# Patient Record
Sex: FEMALE | Race: WHITE | URBAN - METROPOLITAN AREA
[De-identification: names, ages, dates, MRNs, and addresses within clinical notes are randomized per-mention and may not be internally consistent; named-entity substitution may affect disease eponyms.]

---

## 2020-12-15 ENCOUNTER — OFFICE VISIT (OUTPATIENT)
Dept: URBAN - METROPOLITAN AREA CLINIC 27 | Facility: CLINIC | Age: 40
End: 2020-12-15
Payer: COMMERCIAL

## 2020-12-15 DIAGNOSIS — H25.13 AGE-RELATED NUCLEAR CATARACT, BILATERAL: ICD-10-CM

## 2020-12-15 DIAGNOSIS — E11.3391 TYPE 2 DIAB WITH MOD NONP RTNOP WITHOUT MACULAR EDEMA, R EYE: ICD-10-CM

## 2020-12-15 PROCEDURE — 92014 COMPRE OPH EXAM EST PT 1/>: CPT | Performed by: OPHTHALMOLOGY

## 2020-12-15 PROCEDURE — 92134 CPTRZ OPH DX IMG PST SGM RTA: CPT | Performed by: OPHTHALMOLOGY

## 2020-12-15 ASSESSMENT — INTRAOCULAR PRESSURE
OS: 15
OD: 17

## 2020-12-15 NOTE — IMPRESSION/PLAN
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 s/p Avastin, last 5/21/14 Plan: Exam and OCT reveal stable, mild juxtafoveal edema OS. The diagnosis, natural history, and prognosis of CSDME were discussed in detail with the patient. The risks, benefits, and alternatives of  intraocular and periocular steroids, Avastin, Lucentis, and observation were discussed. Given the overall stability, pt elects to observe today. We will continue to monitor her retinal status annually. Last A1C- 7.3 (10/30/2020) RTC 12 months OCT OU, re-eval

## 2020-12-15 NOTE — IMPRESSION/PLAN
Impression: Age-related nuclear cataract, bilateral: H25.13. OU.  Plan: Observe until bothersome for patient

## 2020-12-15 NOTE — IMPRESSION/PLAN
Impression: Type 2 diab with mod nonp rtnop without macular edema, r eye: W99.0908. OD. Plan: Stable. The diagnosis, natural history, and prognosis of NPDR were discussed. The importance of tight blood sugar, blood pressure, and cholesterol control and their relationship to progression of NPDR were thoroughly reviewed with the patient.

## 2021-12-14 ENCOUNTER — OFFICE VISIT (OUTPATIENT)
Dept: URBAN - METROPOLITAN AREA CLINIC 27 | Facility: CLINIC | Age: 41
End: 2021-12-14
Payer: COMMERCIAL

## 2021-12-14 DIAGNOSIS — E11.3312 TYPE 2 DIABETES MELLITUS WITH MODERATE NONPROLIFERATIVE DIABETIC RETINOPATHY WITH MACULAR EDEMA, LEFT EYE: Primary | ICD-10-CM

## 2021-12-14 PROCEDURE — 92134 CPTRZ OPH DX IMG PST SGM RTA: CPT | Performed by: OPHTHALMOLOGY

## 2021-12-14 PROCEDURE — 92014 COMPRE OPH EXAM EST PT 1/>: CPT | Performed by: OPHTHALMOLOGY

## 2021-12-14 ASSESSMENT — INTRAOCULAR PRESSURE
OS: 16
OD: 18

## 2021-12-14 NOTE — IMPRESSION/PLAN
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 s/p Avastin, last 5/21/14 Plan: Exam and OCT reveal resolved edema OS. Overall doing very well. The diagnosis, natural history, and prognosis of CSDME were discussed in detail with the patient. The risks, benefits, and alternatives of  intraocular and periocular steroids, Avastin, Lucentis, and observation were discussed. Given the overall stability, pt elects to continue observation. We will continue to monitor her retinal status annually. Last A1C- 9.2 (12/21) RTC 12 months OCT OU, re-eval

## 2021-12-14 NOTE — IMPRESSION/PLAN
Impression: Type 2 diab with mod nonp rtnop without macular edema, r eye: Q19.0403. OD. Plan: Stable. The diagnosis, natural history, and prognosis of NPDR were discussed. The importance of tight blood sugar, blood pressure, and cholesterol control and their relationship to progression of NPDR were thoroughly reviewed with the patient.

## 2022-12-15 ENCOUNTER — OFFICE VISIT (OUTPATIENT)
Dept: URBAN - METROPOLITAN AREA CLINIC 27 | Facility: CLINIC | Age: 42
End: 2022-12-15
Payer: COMMERCIAL

## 2022-12-15 DIAGNOSIS — E11.3312 TYPE 2 DIABETES MELLITUS WITH MODERATE NONPROLIFERATIVE DIABETIC RETINOPATHY WITH MACULAR EDEMA, LEFT EYE: Primary | ICD-10-CM

## 2022-12-15 DIAGNOSIS — E11.3391 TYPE 2 DIAB WITH MOD NONP RTNOP WITHOUT MACULAR EDEMA, R EYE: ICD-10-CM

## 2022-12-15 DIAGNOSIS — H25.13 AGE-RELATED NUCLEAR CATARACT, BILATERAL: ICD-10-CM

## 2022-12-15 PROCEDURE — 92134 CPTRZ OPH DX IMG PST SGM RTA: CPT | Performed by: OPHTHALMOLOGY

## 2022-12-15 PROCEDURE — 92014 COMPRE OPH EXAM EST PT 1/>: CPT | Performed by: OPHTHALMOLOGY

## 2022-12-15 ASSESSMENT — INTRAOCULAR PRESSURE
OD: 12
OS: 9

## 2022-12-15 NOTE — IMPRESSION/PLAN
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 s/p Avastin, last 5/21/14 Plan: Exam and OCT continue to demonstrate resolved edema OS. Still doing very well. The diagnosis, natural history, and prognosis of CSDME were discussed in detail with the patient. The risks, benefits, and alternatives of  intraocular and periocular steroids, Avastin, Lucentis, and observation were discussed. Pt elects to continue observation. We will continue to monitor her retinal status biannually. Last A1C- 7.6 (Oct 2022) RTC 24 months OCT OU, re-eval

## 2022-12-15 NOTE — IMPRESSION/PLAN
Impression: Type 2 diab with mod nonp rtnop without macular edema, r eye: Z64.8573. OD. Plan: Stable. The diagnosis, natural history, and prognosis of NPDR were discussed. The importance of tight blood sugar, blood pressure, and cholesterol control and their relationship to progression of NPDR were thoroughly reviewed with the patient.

## 2025-02-19 ENCOUNTER — OFFICE VISIT (OUTPATIENT)
Dept: URBAN - METROPOLITAN AREA CLINIC 27 | Facility: CLINIC | Age: 45
End: 2025-02-19
Payer: COMMERCIAL

## 2025-02-19 DIAGNOSIS — E11.3312 TYPE 2 DIABETES MELLITUS WITH MODERATE NONPROLIFERATIVE DIABETIC RETINOPATHY WITH MACULAR EDEMA, LEFT EYE: Primary | ICD-10-CM

## 2025-02-19 DIAGNOSIS — H25.13 AGE-RELATED NUCLEAR CATARACT, BILATERAL: ICD-10-CM

## 2025-02-19 DIAGNOSIS — E11.3391 TYPE 2 DIAB WITH MOD NONP RTNOP WITHOUT MACULAR EDEMA, R EYE: ICD-10-CM

## 2025-02-19 PROCEDURE — 92134 CPTRZ OPH DX IMG PST SGM RTA: CPT | Performed by: OPHTHALMOLOGY

## 2025-02-19 PROCEDURE — 92014 COMPRE OPH EXAM EST PT 1/>: CPT | Performed by: OPHTHALMOLOGY

## 2025-02-19 ASSESSMENT — INTRAOCULAR PRESSURE
OS: 9
OD: 10